# Patient Record
(demographics unavailable — no encounter records)

---

## 2024-10-08 NOTE — ASSESSMENT
[FreeTextEntry1] : The patient is a 56F with a pmhx of cerebral aneurysms s/p repair and palpitations presenting for follow up.  #Palpitations- resolved Currently in NSR Previous TTE and Holter monitor in 2021 normal TTE Nov 2023 normal - change lopressor 25mg BID to Toprol 50mg qd - continue ASA 81 mg qd for cerebral aneurysm - follow up in 1 year or as needed

## 2024-10-08 NOTE — PHYSICAL EXAM
[Well Developed] : well developed [Well Nourished] : well nourished [Normal Conjunctiva] : normal conjunctiva [Normal Venous Pressure] : normal venous pressure [No Carotid Bruit] : no carotid bruit [Normal S1, S2] : normal S1, S2 [No Murmur] : no murmur [No Rub] : no rub [Clear Lung Fields] : clear lung fields [Good Air Entry] : good air entry [No Respiratory Distress] : no respiratory distress  [Soft] : abdomen soft [Non Tender] : non-tender [Normal] : no edema, no cyanosis, no clubbing, no varicosities [No Edema] : no edema

## 2024-10-08 NOTE — REASON FOR VISIT
[Symptom and Test Evaluation] : symptom and test evaluation [FreeTextEntry1] : The patient is a 56F with a pmhx of cerebral aneurysms s/p repair and palpitations presenting for follow up.

## 2024-10-08 NOTE — HISTORY OF PRESENT ILLNESS
[FreeTextEntry1] : Patient last seen August 2023. Since then patient states that she has largely been symptom free. TTE November 2023 with normal LV function, no valvular disease. Has been compliant with her metoprolol, no issues. Rarely endorses mild palpitations just after she starts to walk that resolves quickly. No chest pain, shortness of breath, nausea, vomiting, diaphoresis.